# Patient Record
Sex: MALE | Race: WHITE | Employment: FULL TIME | URBAN - METROPOLITAN AREA
[De-identification: names, ages, dates, MRNs, and addresses within clinical notes are randomized per-mention and may not be internally consistent; named-entity substitution may affect disease eponyms.]

---

## 2018-01-26 PROCEDURE — 82607 VITAMIN B-12: CPT | Performed by: FAMILY MEDICINE

## 2018-01-26 PROCEDURE — 82746 ASSAY OF FOLIC ACID SERUM: CPT | Performed by: FAMILY MEDICINE

## 2018-02-13 PROCEDURE — 88305 TISSUE EXAM BY PATHOLOGIST: CPT | Performed by: INTERNAL MEDICINE

## 2018-03-08 ENCOUNTER — LAB ENCOUNTER (OUTPATIENT)
Dept: LAB | Age: 63
End: 2018-03-08
Attending: DERMATOLOGY
Payer: COMMERCIAL

## 2018-03-08 DIAGNOSIS — B99.9 INFECTION: ICD-10-CM

## 2018-03-08 PROCEDURE — 87070 CULTURE OTHR SPECIMN AEROBIC: CPT

## 2018-03-08 PROCEDURE — 87147 CULTURE TYPE IMMUNOLOGIC: CPT

## 2018-03-08 PROCEDURE — 87205 SMEAR GRAM STAIN: CPT

## 2018-03-12 NOTE — PROGRESS NOTES
Spoke with patient informing of path results and treatment. Patient verbalized understanding and was advised to contact office with any questions or concerns.

## 2018-03-12 NOTE — PROGRESS NOTES
Wound culture did not grow any \"bad\" bacteria, only bacteria normally found on the skin. He should still finish the doxycycline and apply the mupirocin to both this site and the previous excision site.

## 2021-01-26 PROBLEM — N18.31 STAGE 3A CHRONIC KIDNEY DISEASE (HCC): Status: ACTIVE | Noted: 2021-01-26

## 2022-04-05 PROBLEM — R97.20 ELEVATED PSA: Status: ACTIVE | Noted: 2022-04-05

## 2023-06-05 ENCOUNTER — HOSPITAL ENCOUNTER (OUTPATIENT)
Dept: GENERAL RADIOLOGY | Facility: HOSPITAL | Age: 68
Discharge: HOME | End: 2023-06-05
Attending: UROLOGY
Payer: MEDICARE

## 2023-06-05 ENCOUNTER — LABORATORY ENCOUNTER (OUTPATIENT)
Dept: LAB | Facility: HOSPITAL | Age: 68
End: 2023-06-05
Attending: UROLOGY
Payer: MEDICARE

## 2023-06-05 DIAGNOSIS — N18.31 STAGE 3A CHRONIC KIDNEY DISEASE (HCC): ICD-10-CM

## 2023-06-05 DIAGNOSIS — Z01.818 PRE-OP TESTING: ICD-10-CM

## 2023-06-05 LAB
ANTIBODY SCREEN: NEGATIVE
RH BLOOD TYPE: POSITIVE

## 2023-06-05 PROCEDURE — 86850 RBC ANTIBODY SCREEN: CPT

## 2023-06-05 PROCEDURE — 86901 BLOOD TYPING SEROLOGIC RH(D): CPT

## 2023-06-05 PROCEDURE — 71046 X-RAY EXAM CHEST 2 VIEWS: CPT | Performed by: UROLOGY

## 2023-06-05 PROCEDURE — 86900 BLOOD TYPING SEROLOGIC ABO: CPT

## 2023-06-13 ENCOUNTER — TELEPHONE (OUTPATIENT)
Dept: RADIATION ONCOLOGY | Facility: HOSPITAL | Age: 68
End: 2023-06-13

## 2023-06-13 ENCOUNTER — TELEPHONE (OUTPATIENT)
Dept: HEMATOLOGY/ONCOLOGY | Facility: HOSPITAL | Age: 68
End: 2023-06-13

## 2023-06-13 NOTE — TELEPHONE ENCOUNTER
NEW CONSULT  Delfino Da Silvaes  : 53  Ph:[phone]  Ref: Srinivasa Hale  To [address]  Dx: Prostate Cancer  Insurance: Medicare part [place] [id number]

## 2023-06-14 ENCOUNTER — TELEPHONE (OUTPATIENT)
Dept: RADIATION ONCOLOGY | Facility: HOSPITAL | Age: 68
End: 2023-06-14

## 2023-06-14 NOTE — TELEPHONE ENCOUNTER
Patient calling to schedule consult with Dr Jacquie Kiran. There are 2 encounters from [place],  1 of them pertaining to medical records.   A Good portion of them,  Can be seen in [facility]

## 2023-06-16 ENCOUNTER — TELEPHONE (OUTPATIENT)
Dept: HEMATOLOGY/ONCOLOGY | Facility: HOSPITAL | Age: 68
End: 2023-06-16

## 2023-06-16 NOTE — TELEPHONE ENCOUNTER
Received an overnight message that the patient would like to schedule an appointment with Dr Amalia Gray. No other information provided.

## 2023-06-26 ENCOUNTER — TELEPHONE (OUTPATIENT)
Dept: RADIATION ONCOLOGY | Facility: HOSPITAL | Age: 68
End: 2023-06-26

## 2023-06-26 NOTE — TELEPHONE ENCOUNTER
Patient is calling to see if his records are there and he want to make an appointment with Dr. Jada Cr.  6/26/23

## 2023-06-26 NOTE — TELEPHONE ENCOUNTER
Patient is calling to see if his records are there and he want to make an appointment with Dr. Soco Mahan.  6/26/23

## 2023-07-18 ENCOUNTER — HOSPITAL ENCOUNTER (OUTPATIENT)
Dept: RADIATION ONCOLOGY | Facility: HOSPITAL | Age: 68
Discharge: HOME | End: 2023-07-18
Attending: RADIOLOGY
Payer: MEDICARE

## 2023-07-18 VITALS
SYSTOLIC BLOOD PRESSURE: 110 MMHG | DIASTOLIC BLOOD PRESSURE: 81 MMHG | HEART RATE: 51 BPM | TEMPERATURE: 96 F | OXYGEN SATURATION: 96 % | RESPIRATION RATE: 18 BRPM | WEIGHT: 233.63 LBS | BODY MASS INDEX: 30 KG/M2

## 2023-07-18 DIAGNOSIS — C61 PROSTATE CANCER (HCC): Primary | ICD-10-CM

## 2023-07-18 PROCEDURE — 99214 OFFICE O/P EST MOD 30 MIN: CPT

## 2023-07-18 NOTE — PATIENT INSTRUCTIONS
- PLEASE CALL [phone] WITH YOUR TREATMENT DECISION, OR IF YOU HAVE QUESTIONS REGARDING RADIATION THERAPY. - IF YOU DECIDE TO HAVE RADIATION TREATMENTS, WE WILL CALL TO SCHEDULE YOU FOR YOUR CT SIMULATION FOR RADIATION PLANNING.

## 2023-07-19 ENCOUNTER — RESEARCH ENCOUNTER (OUTPATIENT)
Dept: HEMATOLOGY/ONCOLOGY | Facility: HOSPITAL | Age: 68
End: 2023-07-19

## 2023-07-19 NOTE — PROGRESS NOTES
Research note  Potential Predict candidate  Phone call    Research nurse attempted to contact patient to discuss interest in Predict clinical trial. Unable to leave a VM. RN will try and contact him again tomorrow.

## 2023-07-20 ENCOUNTER — RESEARCH ENCOUNTER (OUTPATIENT)
Dept: HEMATOLOGY/ONCOLOGY | Facility: HOSPITAL | Age: 68
End: 2023-07-20

## 2023-07-20 NOTE — PROGRESS NOTES
Research Note. Study: NRG-/PREDICT-RT  Potential study patient  Visit: Phone call     Patient was presented with the trial by Dr. Yu Porras at his radiation consultation 7/18/23. Research RN contacted patient by phone to review study in more detail and jarod interest in participation. RN reviewed the purpose, study procedures/treatment, risks/side effects of study therapy, study groups, and the randomization process with the patient. Explained that upon signing the consent he would enter the screening phase of study and we would need to obtain tissue from his prostate biopsy at [facility] in order to run the decipher score. Explained to patient his rights as a research participant, and that it is his choice whether he wants to participate or not. RN did inform the patient that his PSMA PET would be expiring, per study timeframe, and we would need to repeat it in order for him to be eligible for study. Patient thanked research for contacting him, but he is planning to stay with Dr. Karis Mcfarland mostly due to the proximity to his house. Research thanked him for taking the time to speak with me and provided my contact information should he have any further questions or concerns.

## 2023-09-18 ENCOUNTER — ORDER TRANSCRIPTION (OUTPATIENT)
Dept: PHYSICAL THERAPY | Facility: HOSPITAL | Age: 68
End: 2023-09-18

## 2023-09-18 DIAGNOSIS — R49.8 HYPOPHONIA: Primary | ICD-10-CM

## 2023-10-09 ENCOUNTER — OFFICE VISIT (OUTPATIENT)
Dept: SPEECH THERAPY | Facility: HOSPITAL | Age: 68
End: 2023-10-09
Attending: Other
Payer: MEDICARE

## 2023-10-09 DIAGNOSIS — R49.8 HYPOPHONIA: Primary | ICD-10-CM

## 2023-10-09 PROCEDURE — 92523 SPEECH SOUND LANG COMPREHEN: CPT

## 2023-10-25 ENCOUNTER — OFFICE VISIT (OUTPATIENT)
Dept: SPEECH THERAPY | Facility: HOSPITAL | Age: 68
End: 2023-10-25
Attending: Other

## 2023-10-25 PROCEDURE — 92507 TX SP LANG VOICE COMM INDIV: CPT

## 2023-11-01 ENCOUNTER — OFFICE VISIT (OUTPATIENT)
Dept: SPEECH THERAPY | Facility: HOSPITAL | Age: 68
End: 2023-11-01
Attending: Other
Payer: MEDICARE

## 2023-11-01 PROCEDURE — 92507 TX SP LANG VOICE COMM INDIV: CPT

## 2023-11-08 ENCOUNTER — OFFICE VISIT (OUTPATIENT)
Dept: SPEECH THERAPY | Facility: HOSPITAL | Age: 68
End: 2023-11-08
Attending: Other
Payer: MEDICARE

## 2023-11-08 PROCEDURE — 92507 TX SP LANG VOICE COMM INDIV: CPT

## 2023-11-15 ENCOUNTER — APPOINTMENT (OUTPATIENT)
Dept: SPEECH THERAPY | Facility: HOSPITAL | Age: 68
End: 2023-11-15
Attending: Other
Payer: MEDICARE

## 2023-11-20 ENCOUNTER — OFFICE VISIT (OUTPATIENT)
Dept: SPEECH THERAPY | Facility: HOSPITAL | Age: 68
End: 2023-11-20
Attending: Other
Payer: MEDICARE

## 2023-11-20 PROCEDURE — 92507 TX SP LANG VOICE COMM INDIV: CPT

## 2023-11-27 ENCOUNTER — APPOINTMENT (OUTPATIENT)
Dept: SPEECH THERAPY | Facility: HOSPITAL | Age: 68
End: 2023-11-27
Attending: Other
Payer: MEDICARE

## 2023-12-04 ENCOUNTER — APPOINTMENT (OUTPATIENT)
Dept: SPEECH THERAPY | Facility: HOSPITAL | Age: 68
End: 2023-12-04
Attending: Other
Payer: MEDICARE

## 2023-12-11 ENCOUNTER — OFFICE VISIT (OUTPATIENT)
Dept: SPEECH THERAPY | Facility: HOSPITAL | Age: 68
End: 2023-12-11
Attending: Other
Payer: MEDICARE

## 2023-12-11 PROCEDURE — 92507 TX SP LANG VOICE COMM INDIV: CPT

## 2023-12-18 ENCOUNTER — OFFICE VISIT (OUTPATIENT)
Dept: SPEECH THERAPY | Facility: HOSPITAL | Age: 68
End: 2023-12-18
Attending: Other
Payer: MEDICARE

## 2023-12-18 PROCEDURE — 92507 TX SP LANG VOICE COMM INDIV: CPT

## 2023-12-27 ENCOUNTER — APPOINTMENT (OUTPATIENT)
Dept: SPEECH THERAPY | Facility: HOSPITAL | Age: 68
End: 2023-12-27
Attending: Other
Payer: MEDICARE

## 2024-01-03 ENCOUNTER — OFFICE VISIT (OUTPATIENT)
Dept: SPEECH THERAPY | Facility: HOSPITAL | Age: 69
End: 2024-01-03
Attending: Other
Payer: MEDICARE

## 2024-01-03 PROCEDURE — 92507 TX SP LANG VOICE COMM INDIV: CPT

## 2024-01-03 NOTE — PROGRESS NOTES
Diagnosis:   Hypophonia (R49.8)       Referring Provider: Tamiko  Date of Evaluation:    10/9/23    Precautions:  None Next MD visit:   none scheduled  Date of Surgery: n/a   Insurance Primary/Secondary: MEDICARE / COMMERCIAL     # Auth Visits: NA            Subjective: No reported changes    Pain: 0/10      Objective:     Date: 10/25/2023  TX#: 2/10 Date: 11/1/2023  TX#: 3/10 Date: 11/8/2023  TX#: 4/10 Date: 11/20/2023  TX#: 5/10 Date: 12/11/2023  Tx#: 6/10 Date: 12/18/2023  Tx#: 7/10   EMST-150 at 30 cmH20 WHITE MOUTHPIECE   25/25 reps  30-second rest break EMST-150 at 30 cmH20 WHITE MOUTHPIECE   25/25 reps  30-second rest break EMST-150 at 30 cmH20 WHITE MOUTHPIECE   25/25 reps  25-second rest break EMST-150 at 30 cmH20 WHITE MOUTHPIECE   25/25 reps  25-second rest break EMST-150 at 37.5 cmH20 WHITE MOUTHPIECE   25/25 reps  30-second rest break EMST-150 at 37.5 cmH20 WHITE MOUTHPIECE   25/25 reps  30-second rest break   Sustained phonation via \"ah\": x15 reps average 80 dB for 6.5 seconds Sustained phonation via \"ah\": x10 reps average 74 dB for 7.0 seconds Sustained phonation via \"ah\": x10 reps average 83 dB for 4.7 seconds Sustained phonation via \"ah\": x10 reps average 83 dB for 5.0 seconds Sustained phonation via \"ah\": x10 reps average 88 dB for 3.9 seconds Sustained phonation via \"ah\": x10 reps average 86 dB for 4.7 seconds     Ascending/descending pitch glide: x10 reps average 80 dB given min verbal cues Ascending/descending pitch glide: x10 reps average 80 dB given min verbal cues       Functional phrases: average 77 dB given min verbal cues  GOAL MET        Date: 1/3/2024  Tx#: 8/10 Date:       TX#:  Date:       TX#:  Date:          TX#:  Date:   Tx#:  Date:   Tx#:   IOPI lingual strength  Anterior @11 kPa: 50/50  Posterior @12 kPa: 50/50            Assessment: This facility received an IOPI device therefore, labial and lingual strength were measured. Pt's labial and lingual strength are both significantly  below normal limits and goals have been added to enhance labial/lingual strength via IOPI. Pt able to complete anterior and posterior lingual presses x50 each. Pt benefited from visual cues (i.e., green light on device) for feedback of performance. Recommend labial and lingual strengthening be addressed in conjunction with expiratory muscle strength training.       Goals:     LTG    To improve breath support for improved vocal loudness in order to communicate wants, needs, and ideas to familiar and unfamiliar communication partners across settings    STGs  To perform vocal warm-up exercises, x5 reps given min verbal cues    To perform sustained phonation exercise “ah” x10 reps given min verbal cues    To perform ascending/descending pitch glide, x10 reps given min verbal cues    To perform counting exercise x3 reps given min verbal cues    To read a list of words/phrases given min verbal cues 11/8/2023: Goal met   To read a list of sentences given min verbal cues    To perform cognitive-communicative exercises given min verbal cues    To perform x5 sets of x5 reps via EMST-150 at 37.5 cmH20 with breaks per EMST-150 protocol    To complete 2 sets of 25 anterior AND posterior lingual presses (for a total of 50) using [employer] Oral Performance Instrument  at 80% of maximum pressure as evidenced by achieving the green light indicator in 100% of trials 1/3/2024: MIPA: 14 kPa, MIPP: 15 kPa   To complete 2 sets of 25 right AND left labial presses (for a total of 50) using [employer] Oral Performance Instrument  at 80% of maximum pressure as evidenced by achieving the green light indicator in 100% of trials 1/3/2024: MIPL: 7 kPa, MIPR: 6 kPa     Plan: Recommend continue POC as established    HEP: EMST-150, sustained phonation    Charges: 29216       Total Treatment Time: 45 min

## 2024-01-09 ENCOUNTER — APPOINTMENT (OUTPATIENT)
Dept: SPEECH THERAPY | Facility: HOSPITAL | Age: 69
End: 2024-01-09
Attending: Other
Payer: MEDICARE

## 2024-02-13 ENCOUNTER — OFFICE VISIT (OUTPATIENT)
Dept: SPEECH THERAPY | Facility: HOSPITAL | Age: 69
End: 2024-02-13
Attending: Other
Payer: MEDICARE

## 2024-02-13 PROCEDURE — 92507 TX SP LANG VOICE COMM INDIV: CPT

## 2024-02-13 NOTE — PROGRESS NOTES
Diagnosis:   Hypophonia (R49.8)       Referring Provider: Tamiko  Date of Evaluation:    10/9/23    Precautions:  None Next MD visit:   none scheduled  Date of Surgery: n/a   Insurance Primary/Secondary: MEDICARE / COMMERCIAL     # Auth Visits: NA            Subjective: Pt saw a neurologist and had a brain scan which came back as normal.     Pain: 0/10      Objective:     Date: 10/25/2023  TX#: 2/10 Date: 11/1/2023  TX#: 3/10 Date: 11/8/2023  TX#: 4/10 Date: 11/20/2023  TX#: 5/10 Date: 12/11/2023  Tx#: 6/10 Date: 12/18/2023  Tx#: 7/10   EMST-150 at 30 cmH20 WHITE MOUTHPIECE   25/25 reps  30-second rest break EMST-150 at 30 cmH20 WHITE MOUTHPIECE   25/25 reps  30-second rest break EMST-150 at 30 cmH20 WHITE MOUTHPIECE   25/25 reps  25-second rest break EMST-150 at 30 cmH20 WHITE MOUTHPIECE   25/25 reps  25-second rest break EMST-150 at 37.5 cmH20 WHITE MOUTHPIECE   25/25 reps  30-second rest break EMST-150 at 37.5 cmH20 WHITE MOUTHPIECE   25/25 reps  30-second rest break   Sustained phonation via \"ah\": x15 reps average 80 dB for 6.5 seconds Sustained phonation via \"ah\": x10 reps average 74 dB for 7.0 seconds Sustained phonation via \"ah\": x10 reps average 83 dB for 4.7 seconds Sustained phonation via \"ah\": x10 reps average 83 dB for 5.0 seconds Sustained phonation via \"ah\": x10 reps average 88 dB for 3.9 seconds Sustained phonation via \"ah\": x10 reps average 86 dB for 4.7 seconds     Ascending/descending pitch glide: x10 reps average 80 dB given min verbal cues Ascending/descending pitch glide: x10 reps average 80 dB given min verbal cues       Functional phrases: average 77 dB given min verbal cues  GOAL MET        Date: 1/3/2024  Tx#: 8/10 Date: 2/13/2024  Tx#: 9/10 Date:       TX#:  Date:          TX#:  Date:   Tx#:  Date:   Tx#:    EMST-150 at 52.5 cmH20 WHITE MOUTHPIECE   25/25 reps  30-second rest break       IOPI lingual strength  MIPA: 14 kPa  MIPP: 15 kPa  Anterior @11 kPa: 50/50  Posterior @12 kPa: 50/50             Assessment: EMST-150 resistance increased per protocol. Progress noted as Pt able to achieve audible airflow at increased resistance across all trials suggesting improvements in expiratory muscle strength and endurance. Pt continued to benefit from rest breaks per EMST-150 protocol. In addition, Pt benefited from use of nose clip and manually pressing his cheeks in to facilitate airflow.       Goals:     LTG    To improve breath support for improved vocal loudness in order to communicate wants, needs, and ideas to familiar and unfamiliar communication partners across settings    STGs  To perform vocal warm-up exercises, x5 reps given min verbal cues    To perform sustained phonation exercise “ah” x10 reps given min verbal cues    To perform ascending/descending pitch glide, x10 reps given min verbal cues    To perform counting exercise x3 reps given min verbal cues    To read a list of words/phrases given min verbal cues 11/8/2023: Goal met   To read a list of sentences given min verbal cues    To perform cognitive-communicative exercises given min verbal cues    To perform x5 sets of x5 reps via EMST-150 at 52.5 cmH20 with breaks per EMST-150 protocol    To complete 2 sets of 25 anterior AND posterior lingual presses (for a total of 50) using [employer] Instrument  at 80% of maximum pressure as evidenced by achieving the green light indicator in 100% of trials 1/3/2024: MIPA: 14 kPa, MIPP: 15 kPa   To complete 2 sets of 25 right AND left labial presses (for a total of 50) using [employer] Instrument  at 80% of maximum pressure as evidenced by achieving the green light indicator in 100% of trials 1/3/2024: MIPL: 7 kPa, MIPR: 6 kPa     Plan: Recommend continue POC as established    HEP: EMST-150, sustained phonation    Charges: [id number]       Total Treatment Time: 45 min

## 2024-02-23 ENCOUNTER — APPOINTMENT (OUTPATIENT)
Dept: SPEECH THERAPY | Facility: HOSPITAL | Age: 69
End: 2024-02-23
Attending: Other
Payer: MEDICARE

## 2024-02-23 PROCEDURE — 92507 TX SP LANG VOICE COMM INDIV: CPT

## 2024-02-23 NOTE — PROGRESS NOTES
Diagnosis:   Hypophonia (R49.8)       Referring Provider: Tamiko  Date of Evaluation:    10/9/23    Precautions:  None Next MD visit:   none scheduled  Date of Surgery: n/a   Insurance Primary/Secondary: MEDICARE / COMMERCIAL     # Auth Visits: NA            Subjective: No reported changes since last session    Pain: 0/10      Objective:     Date: 10/25/2023  TX#: 2/10 Date: 11/1/2023  TX#: 3/10 Date: 11/8/2023  TX#: 4/10 Date: 11/20/2023  TX#: 5/10 Date: 12/11/2023  Tx#: 6/10 Date: 12/18/2023  Tx#: 7/10   EMST-150 at 30 cmH20 WHITE MOUTHPIECE   25/25 reps  30-second rest break EMST-150 at 30 cmH20 WHITE MOUTHPIECE   25/25 reps  30-second rest break EMST-150 at 30 cmH20 WHITE MOUTHPIECE   25/25 reps  25-second rest break EMST-150 at 30 cmH20 WHITE MOUTHPIECE   25/25 reps  25-second rest break EMST-150 at 37.5 cmH20 WHITE MOUTHPIECE   25/25 reps  30-second rest break EMST-150 at 37.5 cmH20 WHITE MOUTHPIECE   25/25 reps  30-second rest break   Sustained phonation via \"ah\": x15 reps average 80 dB for 6.5 seconds Sustained phonation via \"ah\": x10 reps average 74 dB for 7.0 seconds Sustained phonation via \"ah\": x10 reps average 83 dB for 4.7 seconds Sustained phonation via \"ah\": x10 reps average 83 dB for 5.0 seconds Sustained phonation via \"ah\": x10 reps average 88 dB for 3.9 seconds Sustained phonation via \"ah\": x10 reps average 86 dB for 4.7 seconds     Ascending/descending pitch glide: x10 reps average 80 dB given min verbal cues Ascending/descending pitch glide: x10 reps average 80 dB given min verbal cues       Functional phrases: average 77 dB given min verbal cues  GOAL MET        Date: 1/3/2024  Tx#: 8/10 Date: 2/13/2024  Tx#: 9/10 Date:       TX#:  Date:          TX#:  Date:   Tx#:  Date:   Tx#:    EMST-150 at 52.5 cmH20 WHITE MOUTHPIECE   25/25 reps  30-second rest break       IOPI lingual strength  MIPA: 14 kPa  MIPP: 15 kPa  Anterior @11 kPa: 50/50  Posterior @12 kPa: 50/50         Sustained phonation via \"ah\":  x10 reps average 80 dB for 6.1 seconds           Assessment: EMST-150 resistance increased per protocol. Progress noted as Pt able to achieve audible airflow at increased resistance across all trials suggesting improvements in expiratory muscle strength and endurance. Pt continued to benefit from rest breaks per EMST-150 protocol. In addition, Pt benefited from use of nose clip and manually pressing his cheeks in to facilitate airflow. Progress noted as Pt with overall increased length compared to previous sessions with a clear vocal quality.       Goals:     LTG    To improve breath support for improved vocal loudness in order to communicate wants, needs, and ideas to familiar and unfamiliar communication partners across settings    STGs  To perform vocal warm-up exercises, x5 reps given min verbal cues    To perform sustained phonation exercise “ah” x10 reps given min verbal cues    To perform ascending/descending pitch glide, x10 reps given min verbal cues    To perform counting exercise x3 reps given min verbal cues    To read a list of words/phrases given min verbal cues 11/8/2023: Goal met   To read a list of sentences given min verbal cues    To perform cognitive-communicative exercises given min verbal cues    To perform x5 sets of x5 reps via EMST-150 at 52.5 cmH20 with breaks per EMST-150 protocol    To complete 2 sets of 25 anterior AND posterior lingual presses (for a total of 50) using [employer] Performance Instrument  at 80% of maximum pressure as evidenced by achieving the green light indicator in 100% of trials 1/3/2024: MIPA: 14 kPa, MIPP: 15 kPa   To complete 2 sets of 25 right AND left labial presses (for a total of 50) using [employer] Instrument  at 80% of maximum pressure as evidenced by achieving the green light indicator in 100% of trials 1/3/2024: MIPL: 7 kPa, MIPR: 6 kPa     Plan: Recommend continue POC as established    HEP: EMST-150, sustained phonation    Charges: [id number]        Total Treatment Time: 45 min

## 2024-02-27 ENCOUNTER — APPOINTMENT (OUTPATIENT)
Dept: SPEECH THERAPY | Facility: HOSPITAL | Age: 69
End: 2024-02-27
Attending: Other
Payer: MEDICARE

## 2024-03-06 ENCOUNTER — OFFICE VISIT (OUTPATIENT)
Dept: SPEECH THERAPY | Facility: HOSPITAL | Age: 69
End: 2024-03-06
Attending: Other
Payer: MEDICARE

## 2024-03-06 PROCEDURE — 92507 TX SP LANG VOICE COMM INDIV: CPT

## 2024-03-06 NOTE — PROGRESS NOTES
Diagnosis:   Hypophonia (R49.8)       Referring Provider: Tamiko  Date of Evaluation:    10/9/23    Precautions:  None Next MD visit:   none scheduled  Date of Surgery: n/a   Insurance Primary/Secondary: MEDICARE / COMMERCIAL     # Auth Visits: NA             Progress Summary  Pt has attended 10 visits in Speech Therapy.     Assessment: Progress noted during this course of treatment. Treatment has focused on enhancing breath support as well as lingua/labial strength for improved speech intelligibility. Progress noted as the resistance on the EMST-150 has increased suggesting improvements in expiratory muscle strength and endurance. Pt continues to benefit from use of nose clip d/t nasal emission as well as manually pressing his cheeks in d/t labial leakage. The [place] Oral Performance Instrument was introduced to enhance Pt's labial/lingual strength. Pt's scores are below normal limits. This exercise has only been perfomed x1 since its introduction. Outcome measures support progress noted in treatment as all of Pt's SMR scores improved suggesting improvements in coordination and precision of articulators. In addition, Pt's s/z ratio is now WNL suggesting improvements in breath support for vocal fold vibration. Despite progress, Pt continues to demonstrate a mild-moderate undifferentiated dysarthria characterized by imprecise consonant production, a rough/breathy vocal quality, reduced breath support for speech, reduced loudness, and reduced stress. Continued skilled ST services are strongly recommended.    Outcome Measures:    SMRs  Puh: 5.6 --> 6.2 (WNL=6.4), slow/imprecise  Tuh: 4.8 -->  5.8 (WNL = 6.1), slow/imprecise  Kuh: 3.8 --5.0 (WNL = 5.7), slow/imprecise    AMR  Puh-tuh-kuh: 24/15 seconds --> 24/15 seconds (WNL = 25/15 seconds), slow/imprecise      S/Z Ratio: 1.4 --> .83 (s/z ratio of 1.4 or great suggests laryngeal pathology)      Goals:     LTG    To improve breath support for improved vocal loudness in order  to communicate wants, needs, and ideas to familiar and unfamiliar communication partners across settings - 3/6/2024: Progressing, continue    STGs  To perform vocal warm-up exercises, x5 reps given min verbal cues 3/6/2024: Progressing, continue   To perform sustained phonation exercise “ah” x10 reps given min verbal cues 3/6/2024: Progressing, continue   To perform ascending/descending pitch glide, x10 reps given min verbal cues 3/6/2024: Progressing, continue   To perform counting exercise x3 reps given min verbal cues 3/6/2024: Progressing, continue   To read a list of words/phrases given min verbal cues 11/8/2023: Goal met   To read a list of sentences given min verbal cues 3/6/2024: Progressing, continue   To perform cognitive-communicative exercises given min verbal cues 3/6/2024: Progressing, continue   To perform x5 sets of x5 reps via EMST-150 at 52.5 cmH20 with breaks per EMST-150 protocol 3/6/2024: Progressing, continue   To complete 2 sets of 25 anterior AND posterior lingual presses (for a total of 50) using [employer] Instrument  at 80% of maximum pressure as evidenced by achieving the green light indicator in 100% of trials 1/3/2024: MIPA: 14 kPa, MIPP: 15 kPa  3/6/2024: Progressing, continue   To complete 2 sets of 25 right AND left labial presses (for a total of 50) using [employer] Instrument  at 80% of maximum pressure as evidenced by achieving the green light indicator in 100% of trials 1/3/2024: MIPL: 7 kPa, MIPR: 6 kPa  3/6/2024: Progressing, continue       Plan: Continue skilled Speech Therapy 1x/week or a total of 10 visits over a 90 day period. Treatment will include: speech therapy       Patient/Family/Caregiver was advised of these findings, precautions, and treatment options and has agreed to actively participate in planning and for this course of care.    Thank you for your referral. If you have any questions, please contact me at Dept:  [phone].    Sincerely,  Electronically signed by therapist: Amy Mendoza SLP     Physician's certification required:  Yes  Please co-sign or sign and return this letter via fax as soon as possible to [phone].   I certify the need for these services furnished under this plan of treatment and while under my care.    X___________________________________________________ Date____________________    Certification From: 3/6/2024  To:6/4/2024     Subjective: No reported changes since last session    Pain: 0/10      Objective:     Date: 10/25/2023  TX#: 2/10 Date: 11/1/2023  TX#: 3/10 Date: 11/8/2023  TX#: 4/10 Date: 11/20/2023  TX#: 5/10 Date: 12/11/2023  Tx#: 6/10 Date: 12/18/2023  Tx#: 7/10   EMST-150 at 30 cmH20 WHITE MOUTHPIECE   25/25 reps  30-second rest break EMST-150 at 30 cmH20 WHITE MOUTHPIECE   25/25 reps  30-second rest break EMST-150 at 30 cmH20 WHITE MOUTHPIECE   25/25 reps  25-second rest break EMST-150 at 30 cmH20 WHITE MOUTHPIECE   25/25 reps  25-second rest break EMST-150 at 37.5 cmH20 WHITE MOUTHPIECE   25/25 reps  30-second rest break EMST-150 at 37.5 cmH20 WHITE MOUTHPIECE   25/25 reps  30-second rest break   Sustained phonation via \"ah\": x15 reps average 80 dB for 6.5 seconds Sustained phonation via \"ah\": x10 reps average 74 dB for 7.0 seconds Sustained phonation via \"ah\": x10 reps average 83 dB for 4.7 seconds Sustained phonation via \"ah\": x10 reps average 83 dB for 5.0 seconds Sustained phonation via \"ah\": x10 reps average 88 dB for 3.9 seconds Sustained phonation via \"ah\": x10 reps average 86 dB for 4.7 seconds     Ascending/descending pitch glide: x10 reps average 80 dB given min verbal cues Ascending/descending pitch glide: x10 reps average 80 dB given min verbal cues       Functional phrases: average 77 dB given min verbal cues  GOAL MET        Date: 1/3/2024  Tx#: 8/10 Date: 2/13/2024  Tx#: 9/10 Date: 3/6/2024       TX#: 10/10 Date:          TX#:  Date:   Tx#:  Date:   Tx#:     EMST-150 at 52.5 cmH20 WHITE MOUTHPIECE   25/25 reps  30-second rest break EMST-150 at 52.5 cmH20 WHITE MOUTHPIECE   5/5 reps  30-second rest break      IOPI lingual strength  MIPA: 14 kPa  MIPP: 15 kPa  Anterior @11 kPa: 50/50  Posterior @12 kPa: 50/50         Sustained phonation via \"ah\": x10 reps average 80 dB for 6.1 seconds         Spirometer HEP        Plan: Recommend continue POC as established    HEP: EMST-150, sustained phonation    Charges: [id number]       Total Treatment Time: 45 min

## 2024-03-11 ENCOUNTER — TELEPHONE (OUTPATIENT)
Dept: PHYSICAL THERAPY | Facility: HOSPITAL | Age: 69
End: 2024-03-11

## 2024-03-15 ENCOUNTER — OFFICE VISIT (OUTPATIENT)
Dept: SPEECH THERAPY | Facility: HOSPITAL | Age: 69
End: 2024-03-15
Attending: Other
Payer: MEDICARE

## 2024-03-15 PROCEDURE — 92507 TX SP LANG VOICE COMM INDIV: CPT

## 2024-03-15 NOTE — PROGRESS NOTES
Diagnosis:   Hypophonia (R49.8)       Referring Provider: Tamiko  Date of Evaluation:    10/9/23    Precautions:  None Next MD visit:   none scheduled  Date of Surgery: n/a   Insurance Primary/Secondary: MEDICARE / COMMERCIAL     # Auth Visits: NA            Subjective: No reported changes since last session    Pain: 0/10      Objective:     Date: 10/25/2023  TX#: 2/10 Date: 11/1/2023  TX#: 3/10 Date: 11/8/2023  TX#: 4/10 Date: 11/20/2023  TX#: 5/10 Date: 12/11/2023  Tx#: 6/10 Date: 12/18/2023  Tx#: 7/10   EMST-150 at 30 cmH20 WHITE MOUTHPIECE   25/25 reps  30-second rest break EMST-150 at 30 cmH20 WHITE MOUTHPIECE   25/25 reps  30-second rest break EMST-150 at 30 cmH20 WHITE MOUTHPIECE   25/25 reps  25-second rest break EMST-150 at 30 cmH20 WHITE MOUTHPIECE   25/25 reps  25-second rest break EMST-150 at 37.5 cmH20 WHITE MOUTHPIECE   25/25 reps  30-second rest break EMST-150 at 37.5 cmH20 WHITE MOUTHPIECE   25/25 reps  30-second rest break   Sustained phonation via \"ah\": x15 reps average 80 dB for 6.5 seconds Sustained phonation via \"ah\": x10 reps average 74 dB for 7.0 seconds Sustained phonation via \"ah\": x10 reps average 83 dB for 4.7 seconds Sustained phonation via \"ah\": x10 reps average 83 dB for 5.0 seconds Sustained phonation via \"ah\": x10 reps average 88 dB for 3.9 seconds Sustained phonation via \"ah\": x10 reps average 86 dB for 4.7 seconds     Ascending/descending pitch glide: x10 reps average 80 dB given min verbal cues Ascending/descending pitch glide: x10 reps average 80 dB given min verbal cues       Functional phrases: average 77 dB given min verbal cues  GOAL MET        Date: 1/3/2024  Tx#: 8/10 Date: 2/13/2024  Tx#: 9/10 Date: 3/6/2024       TX#: 10/10 Date: 3/15/2024         TX#: 1/10 Date:   Tx#:  Date:   Tx#:    EMST-150 at 52.5 cmH20 WHITE MOUTHPIECE   25/25 reps  30-second rest break EMST-150 at 52.5 cmH20 WHITE MOUTHPIECE   5/5 reps  30-second rest break EMST-150 at 52.5 + 1/8 cmH20 WHITE  MOUTHPIECE   5/5 reps  30-second rest break     IOPI lingual strength  MIPA: 14 kPa  MIPP: 15 kPa  Anterior @11 kPa: 50/50  Posterior @12 kPa: 50/50   IOPI lingual strength  MIPA: 19 kPa  MIPP: 19 kPa  Anterior @15 kPa: 50/50,   Posterior @15 kPa: 50/50      Sustained phonation via \"ah\": x10 reps average 80 dB for 6.1 seconds         Spirometer HEP        Assessment: MIP taken as it has not be taken since the beginning of January. Progress noted as MIP for both lingual placements increased. In addition, Pt able to complete x50 reps in both the anterior and posterior lingual placements at a higher resistance with no rest breaks required. Pt unable to achieve audible airflow when EMST-150 resistance was increased by 1/4 turn; however, he was able to achieve audible airflow when resistance was increased by 1/8 turn. Pt continued to benefit from use of nose clip d/t nasal emission as well as manually pressing his cheeks in d/t labial leakage.       Goals:     LTG    To improve breath support for improved vocal loudness in order to communicate wants, needs, and ideas to familiar and unfamiliar communication partners across settings - 3/6/2024: Progressing, continue    STGs  To perform vocal warm-up exercises, x5 reps given min verbal cues 3/6/2024: Progressing, continue   To perform sustained phonation exercise “ah” x10 reps given min verbal cues 3/6/2024: Progressing, continue   To perform ascending/descending pitch glide, x10 reps given min verbal cues 3/6/2024: Progressing, continue   To perform counting exercise x3 reps given min verbal cues 3/6/2024: Progressing, continue   To read a list of words/phrases given min verbal cues 11/8/2023: Goal met   To read a list of sentences given min verbal cues 3/6/2024: Progressing, continue   To perform cognitive-communicative exercises given min verbal cues 3/6/2024: Progressing, continue   To perform x5 sets of x5 reps via EMST-150 at 52.5 cmH20 with breaks per EMST-150  protocol 3/6/2024: Progressing, continue   To complete 2 sets of 25 anterior AND posterior lingual presses (for a total of 50) using [employer] Instrument  at 80% of maximum pressure as evidenced by achieving the green light indicator in 100% of trials 1/3/2024: MIPA: 14 kPa, MIPP: 15 kPa  3/6/2024: Progressing, continue   To complete 2 sets of 25 right AND left labial presses (for a total of 50) using [employer] Instrument  at 80% of maximum pressure as evidenced by achieving the green light indicator in 100% of trials 1/3/2024: MIPL: 7 kPa, MIPR: 6 kPa  3/6/2024: Progressing, continue     Plan: Recommend continue POC as established    HEP: EMST-150, sustained phonation    Charges: [id number]       Total Treatment Time: 45 min

## 2024-03-18 ENCOUNTER — OFFICE VISIT (OUTPATIENT)
Dept: SPEECH THERAPY | Facility: HOSPITAL | Age: 69
End: 2024-03-18
Attending: Other
Payer: MEDICARE

## 2024-03-18 PROCEDURE — 92507 TX SP LANG VOICE COMM INDIV: CPT

## 2024-03-18 NOTE — PROGRESS NOTES
Diagnosis:   Hypophonia (R49.8)       Referring Provider: Tamiko  Date of Evaluation:    10/9/23    Precautions:  None Next MD visit:   none scheduled  Date of Surgery: n/a   Insurance Primary/Secondary: MEDICARE / COMMERCIAL     # Auth Visits: NA            Subjective: No reported changes since last session    Pain: 0/10      Objective:     Date: 10/25/2023  TX#: 2/10 Date: 11/1/2023  TX#: 3/10 Date: 11/8/2023  TX#: 4/10 Date: 11/20/2023  TX#: 5/10 Date: 12/11/2023  Tx#: 6/10 Date: 12/18/2023  Tx#: 7/10   EMST-150 at 30 cmH20 WHITE MOUTHPIECE   25/25 reps  30-second rest break EMST-150 at 30 cmH20 WHITE MOUTHPIECE   25/25 reps  30-second rest break EMST-150 at 30 cmH20 WHITE MOUTHPIECE   25/25 reps  25-second rest break EMST-150 at 30 cmH20 WHITE MOUTHPIECE   25/25 reps  25-second rest break EMST-150 at 37.5 cmH20 WHITE MOUTHPIECE   25/25 reps  30-second rest break EMST-150 at 37.5 cmH20 WHITE MOUTHPIECE   25/25 reps  30-second rest break   Sustained phonation via \"ah\": x15 reps average 80 dB for 6.5 seconds Sustained phonation via \"ah\": x10 reps average 74 dB for 7.0 seconds Sustained phonation via \"ah\": x10 reps average 83 dB for 4.7 seconds Sustained phonation via \"ah\": x10 reps average 83 dB for 5.0 seconds Sustained phonation via \"ah\": x10 reps average 88 dB for 3.9 seconds Sustained phonation via \"ah\": x10 reps average 86 dB for 4.7 seconds     Ascending/descending pitch glide: x10 reps average 80 dB given min verbal cues Ascending/descending pitch glide: x10 reps average 80 dB given min verbal cues       Functional phrases: average 77 dB given min verbal cues  GOAL MET        Date: 1/3/2024  Tx#: 8/10 Date: 2/13/2024  Tx#: 9/10 Date: 3/6/2024       TX#: 10/10 Date: 3/15/2024         TX#: 1/10 Date: 3/18/2024  Tx#: 2/10 Date:   Tx#:    EMST-150 at 52.5 cmH20 WHITE MOUTHPIECE   25/25 reps  30-second rest break EMST-150 at 52.5 cmH20 WHITE MOUTHPIECE   5/5 reps  30-second rest break EMST-150 at 52.5 + 1/8 cmH20  WHITE MOUTHPIECE   5/5 reps  30-second rest break     IOPI lingual strength  MIPA: 14 kPa  MIPP: 15 kPa  Anterior @11 kPa: 50/50  Posterior @12 kPa: 50/50   IOPI lingual strength  MIPA: 19 kPa  MIPP: 19 kPa  Anterior @15 kPa: 50/50,   Posterior @15 kPa: 50/50      Sustained phonation via \"ah\": x10 reps average 80 dB for 6.1 seconds   Sustained phonation via \"ah\": x15 reps average 81 dB for 5.7 seconds        Ascending/descending pitch glide: x15 reps average 83 dB given min verbal cues        Oral reading sentences: 74 dB given min verbal cues      Spirometer HEP        Assessment: EMST-150 and IOPI not addressed d/t Pt forgetting his EMST-150 device and the tongue bulb at home. Pt with decrased lenght during sustained phonation; however, his amplitude increased. Progress noted as Pt demonstrated increased vocal amplitude during ascending/descending pitch glides. Pt benefited from min verbal cues when orally reading sentence-level material with adequate vocal amplitude noted; recommend this goal be addressed x1 more session for consistency.         Goals:     LTG    To improve breath support for improved vocal loudness in order to communicate wants, needs, and ideas to familiar and unfamiliar communication partners across settings - 3/6/2024: Progressing, continue    STGs  To perform vocal warm-up exercises, x5 reps given min verbal cues 3/6/2024: Progressing, continue   To perform sustained phonation exercise “ah” x10 reps given min verbal cues 3/6/2024: Progressing, continue   To perform ascending/descending pitch glide, x10 reps given min verbal cues 3/6/2024: Progressing, continue   To perform counting exercise x3 reps given min verbal cues 3/6/2024: Progressing, continue   To read a list of words/phrases given min verbal cues 11/8/2023: Goal met   To read a list of sentences given min verbal cues 3/6/2024: Progressing, continue   To perform cognitive-communicative exercises given min verbal cues 3/6/2024:  Progressing, continue   To perform x5 sets of x5 reps via EMST-150 at 52.5 cmH20 with breaks per EMST-150 protocol 3/6/2024: Progressing, continue   To complete 2 sets of 25 anterior AND posterior lingual presses (for a total of 50) using [employer] Instrument  at 80% of maximum pressure as evidenced by achieving the green light indicator in 100% of trials 1/3/2024: MIPA: 14 kPa, MIPP: 15 kPa  3/6/2024: Progressing, continue   To complete 2 sets of 25 right AND left labial presses (for a total of 50) using [employer] Instrument  at 80% of maximum pressure as evidenced by achieving the green light indicator in 100% of trials 1/3/2024: MIPL: 7 kPa, MIPR: 6 kPa  3/6/2024: Progressing, continue     Plan: Recommend continue POC as established    HEP: EMST-150, sustained phonation    Charges: [id number]       Total Treatment Time: 45 min     no

## 2024-03-26 ENCOUNTER — OFFICE VISIT (OUTPATIENT)
Dept: SPEECH THERAPY | Facility: HOSPITAL | Age: 69
End: 2024-03-26
Attending: Other
Payer: MEDICARE

## 2024-03-26 PROCEDURE — 92507 TX SP LANG VOICE COMM INDIV: CPT

## 2024-03-26 NOTE — PROGRESS NOTES
Diagnosis:   Hypophonia (R49.8)       Referring Provider: Tamiko  Date of Evaluation:    10/9/23    Precautions:  None Next MD visit:   none scheduled  Date of Surgery: n/a   Insurance Primary/Secondary: MEDICARE / COMMERCIAL     # Auth Visits: NA            Subjective: No reported changes since last session    Pain: 0/10      Objective:     Date: 10/25/2023  TX#: 2/10 Date: 11/1/2023  TX#: 3/10 Date: 11/8/2023  TX#: 4/10 Date: 11/20/2023  TX#: 5/10 Date: 12/11/2023  Tx#: 6/10 Date: 12/18/2023  Tx#: 7/10   EMST-150 at 30 cmH20 WHITE MOUTHPIECE   25/25 reps  30-second rest break EMST-150 at 30 cmH20 WHITE MOUTHPIECE   25/25 reps  30-second rest break EMST-150 at 30 cmH20 WHITE MOUTHPIECE   25/25 reps  25-second rest break EMST-150 at 30 cmH20 WHITE MOUTHPIECE   25/25 reps  25-second rest break EMST-150 at 37.5 cmH20 WHITE MOUTHPIECE   25/25 reps  30-second rest break EMST-150 at 37.5 cmH20 WHITE MOUTHPIECE   25/25 reps  30-second rest break   Sustained phonation via \"ah\": x15 reps average 80 dB for 6.5 seconds Sustained phonation via \"ah\": x10 reps average 74 dB for 7.0 seconds Sustained phonation via \"ah\": x10 reps average 83 dB for 4.7 seconds Sustained phonation via \"ah\": x10 reps average 83 dB for 5.0 seconds Sustained phonation via \"ah\": x10 reps average 88 dB for 3.9 seconds Sustained phonation via \"ah\": x10 reps average 86 dB for 4.7 seconds     Ascending/descending pitch glide: x10 reps average 80 dB given min verbal cues Ascending/descending pitch glide: x10 reps average 80 dB given min verbal cues       Functional phrases: average 77 dB given min verbal cues  GOAL MET        Date: 1/3/2024  Tx#: 8/10 Date: 2/13/2024  Tx#: 9/10 Date: 3/6/2024       TX#: 10/10 Date: 3/15/2024         TX#: 1/10 Date: 3/18/2024  Tx#: 2/10 Date: 3/26/2024  Tx#: 3/10    EMST-150 at 52.5 cmH20 WHITE MOUTHPIECE   25/25 reps  30-second rest break EMST-150 at 52.5 cmH20 WHITE MOUTHPIECE   5/5 reps  30-second rest break EMST-150 at 52.5  + 1/8 cmH20 WHITE MOUTHPIECE   5/5 reps  30-second rest break  EMST-150 at 60 cmH20 WHITE MOUTHPIECE   4/5 reps  30-second rest break   IOPI lingual strength  MIPA: 14 kPa  MIPP: 15 kPa  Anterior @11 kPa: 50/50  Posterior @12 kPa: 50/50   IOPI lingual strength  MIPA: 19 kPa  MIPP: 19 kPa  Anterior @15 kPa: 50/50,   Posterior @15 kPa: 50/50  IOPI lingual strength  Anterior @15 kPa: 50/50,   Posterior @15 kPa: 50/50        IOPI labial strength  MIPL: 8 kPa  MIPR: 8 kPa  Left @ 6 kPa: 50/50  Right @ 6 kPa: 50/50      Sustained phonation via \"ah\": x10 reps average 80 dB for 6.1 seconds   Sustained phonation via \"ah\": x15 reps average 81 dB for 5.7 seconds        Ascending/descending pitch glide: x15 reps average 83 dB given min verbal cues        Oral reading sentences: 74 dB given min verbal cues      Spirometer HEP        Assessment: Pt able to complete all 50 reps in anterior/posterior lingual placements without any rest breaks. MIP taken for both R and L labial placements. Pt with significantly reduced labial strength as his MIP was 8 kPa and average strength is 35 kPa. Pt able to complete all 50 reps for both placements at 80% MIP with no rest breaks required. EMST-150 resistance increased by 1/8 turn so device is at 60 cmH20. Pt able to achieve audible airflow in 4/5 trials given rest breaks per EMST-150 protocol. In addition, Pt benefited from use of nose clip d/t nasal emission as well as manually pressing his cheeks in d/t labial leakage.       Goals:     LTG    To improve breath support for improved vocal loudness in order to communicate wants, needs, and ideas to familiar and unfamiliar communication partners across settings - 3/6/2024: Progressing, continue    STGs  To perform vocal warm-up exercises, x5 reps given min verbal cues 3/6/2024: Progressing, continue   To perform sustained phonation exercise “ah” x10 reps given min verbal cues 3/6/2024: Progressing, continue   To perform ascending/descending  pitch glide, x10 reps given min verbal cues 3/6/2024: Progressing, continue   To perform counting exercise x3 reps given min verbal cues 3/6/2024: Progressing, continue   To read a list of words/phrases given min verbal cues 11/8/2023: Goal met   To read a list of sentences given min verbal cues 3/6/2024: Progressing, continue   To perform cognitive-communicative exercises given min verbal cues 3/6/2024: Progressing, continue   To perform x5 sets of x5 reps via EMST-150 at 60 cmH20 with breaks per EMST-150 protocol 3/6/2024: Progressing, continue   To complete 2 sets of 25 anterior AND posterior lingual presses (for a total of 50) using [employer] Instrument  at 80% of maximum pressure as evidenced by achieving the green light indicator in 100% of trials 1/3/2024: MIPA: 14 kPa, MIPP: 15 kPa  3/6/2024: Progressing, continue   To complete 2 sets of 25 right AND left labial presses (for a total of 50) using [employer] Instrument  at 80% of maximum pressure as evidenced by achieving the green light indicator in 100% of trials 1/3/2024: MIPL: 7 kPa, MIPR: 6 kPa  3/6/2024: Progressing, continue     Plan: Recommend continue POC as established    HEP: EMST-150, sustained phonation    Charges: [id number]       Total Treatment Time: 45 min

## 2024-04-05 ENCOUNTER — OFFICE VISIT (OUTPATIENT)
Dept: SPEECH THERAPY | Facility: HOSPITAL | Age: 69
End: 2024-04-05
Attending: Other
Payer: MEDICARE

## 2024-04-05 PROCEDURE — 92507 TX SP LANG VOICE COMM INDIV: CPT

## 2024-04-05 NOTE — PROGRESS NOTES
Diagnosis:   Hypophonia (R49.8)       Referring Provider: Tamiko  Date of Evaluation:    10/9/23    Precautions:  None Next MD visit:   none scheduled  Date of Surgery: n/a   Insurance Primary/Secondary: MEDICARE / COMMERCIAL     # Auth Visits: NA            Subjective: No reported changes since last session    Pain: 0/10      Objective:     Date: 10/25/2023  TX#: 2/10 Date: 11/1/2023  TX#: 3/10 Date: 11/8/2023  TX#: 4/10 Date: 11/20/2023  TX#: 5/10 Date: 12/11/2023  Tx#: 6/10 Date: 12/18/2023  Tx#: 7/10   EMST-150 at 30 cmH20 WHITE MOUTHPIECE   25/25 reps  30-second rest break EMST-150 at 30 cmH20 WHITE MOUTHPIECE   25/25 reps  30-second rest break EMST-150 at 30 cmH20 WHITE MOUTHPIECE   25/25 reps  25-second rest break EMST-150 at 30 cmH20 WHITE MOUTHPIECE   25/25 reps  25-second rest break EMST-150 at 37.5 cmH20 WHITE MOUTHPIECE   25/25 reps  30-second rest break EMST-150 at 37.5 cmH20 WHITE MOUTHPIECE   25/25 reps  30-second rest break   Sustained phonation via \"ah\": x15 reps average 80 dB for 6.5 seconds Sustained phonation via \"ah\": x10 reps average 74 dB for 7.0 seconds Sustained phonation via \"ah\": x10 reps average 83 dB for 4.7 seconds Sustained phonation via \"ah\": x10 reps average 83 dB for 5.0 seconds Sustained phonation via \"ah\": x10 reps average 88 dB for 3.9 seconds Sustained phonation via \"ah\": x10 reps average 86 dB for 4.7 seconds     Ascending/descending pitch glide: x10 reps average 80 dB given min verbal cues Ascending/descending pitch glide: x10 reps average 80 dB given min verbal cues       Functional phrases: average 77 dB given min verbal cues  GOAL MET        Date: 1/3/2024  Tx#: 8/10 Date: 2/13/2024  Tx#: 9/10 Date: 3/6/2024       TX#: 10/10 Date: 3/15/2024         TX#: 1/10 Date: 3/18/2024  Tx#: 2/10 Date: 3/26/2024  Tx#: 3/10    EMST-150 at 52.5 cmH20 WHITE MOUTHPIECE   25/25 reps  30-second rest break EMST-150 at 52.5 cmH20 WHITE MOUTHPIECE   5/5 reps  30-second rest break EMST-150 at 52.5  + 1/8 cmH20 WHITE MOUTHPIECE   5/5 reps  30-second rest break  EMST-150 at 60 cmH20 WHITE MOUTHPIECE   4/5 reps  30-second rest break   IOPI lingual strength  MIPA: 14 kPa  MIPP: 15 kPa  Anterior @11 kPa: 50/50  Posterior @12 kPa: 50/50   IOPI lingual strength  MIPA: 19 kPa  MIPP: 19 kPa  Anterior @15 kPa: 50/50,   Posterior @15 kPa: 50/50  IOPI lingual strength  Anterior @15 kPa: 50/50,   Posterior @15 kPa: 50/50        IOPI labial strength  MIPL: 8 kPa  MIPR: 8 kPa  Left @ 6 kPa: 50/50  Right @ 6 kPa: 50/50      Sustained phonation via \"ah\": x10 reps average 80 dB for 6.1 seconds   Sustained phonation via \"ah\": x15 reps average 81 dB for 5.7 seconds        Ascending/descending pitch glide: x15 reps average 83 dB given min verbal cues        Oral reading sentences: 74 dB given min verbal cues      Spirometer HEP        Date: 4/5/2024  Tx#: 4/10 Date:       TX#:  Date:       TX#:  Date:          TX#:  Date:   TX#:  Date:   TX#:   EMST-150 at 63.75 cmH20 WHITE MOUTHPIECE   10/10 reps  30-second rest break        IOPI lingual strength  Anterior @15 kPa: 50/50,   Posterior @15 kPa: 50/50        IOPI labial strength  Left @ 6 kPa: 50/50  Right @ 6 kPa: 50/50          Assessment: Pt able to complete all 50 reps in anterior/posterior lingual placements without any rest breaks. Pt able to complete all 50 reps for both placements at 80% MIP with no rest breaks required during left and right labial presses. EMST-150 resistance increased per protocol. Progress noted as Pt able to achieve audible airflow at increased resistance across all trials suggesting improvements in expiratory muscle strength and endurance. Pt continued to benefit from rest breaks per EMST-150 protocol. In addition, Pt benefited from use of nose clip d/t nasal emission as well as manually pressing his cheeks in d/t labial leakage.       Goals:     LTG    To improve breath support for improved vocal loudness in order to communicate wants, needs, and ideas  to familiar and unfamiliar communication partners across settings - 3/6/2024: Progressing, continue    STGs  To perform vocal warm-up exercises, x5 reps given min verbal cues 3/6/2024: Progressing, continue   To perform sustained phonation exercise “ah” x10 reps given min verbal cues 3/6/2024: Progressing, continue   To perform ascending/descending pitch glide, x10 reps given min verbal cues 3/6/2024: Progressing, continue   To perform counting exercise x3 reps given min verbal cues 3/6/2024: Progressing, continue   To read a list of words/phrases given min verbal cues 11/8/2023: Goal met   To read a list of sentences given min verbal cues 3/6/2024: Progressing, continue   To perform cognitive-communicative exercises given min verbal cues 3/6/2024: Progressing, continue   To perform x5 sets of x5 reps via EMST-150 at 63.75 cmH20 with breaks per EMST-150 protocol 3/6/2024: Progressing, continue   To complete 2 sets of 25 anterior AND posterior lingual presses (for a total of 50) using [employer] Instrument  at 80% of maximum pressure as evidenced by achieving the green light indicator in 100% of trials 1/3/2024: MIPA: 14 kPa, MIPP: 15 kPa  3/6/2024: Progressing, continue   To complete 2 sets of 25 right AND left labial presses (for a total of 50) using [employer] Instrument  at 80% of maximum pressure as evidenced by achieving the green light indicator in 100% of trials 1/3/2024: MIPL: 7 kPa, MIPR: 6 kPa  3/6/2024: Progressing, continue     Plan: Recommend continue POC as established    HEP: EMST-150, sustained phonation    Charges: [id number]       Total Treatment Time: 45 min

## 2024-04-08 ENCOUNTER — OFFICE VISIT (OUTPATIENT)
Dept: SPEECH THERAPY | Facility: HOSPITAL | Age: 69
End: 2024-04-08
Attending: Other
Payer: MEDICARE

## 2024-04-08 PROCEDURE — 92507 TX SP LANG VOICE COMM INDIV: CPT

## 2024-04-08 NOTE — PROGRESS NOTES
Diagnosis:   Hypophonia (R49.8)       Referring Provider: Tamiko  Date of Evaluation:    10/9/23    Precautions:  None Next MD visit:   none scheduled  Date of Surgery: n/a   Insurance Primary/Secondary: MEDICARE / COMMERCIAL     # Auth Visits: NA            Subjective: No reported changes since last session    Pain: 0/10      Objective:     Date: 10/25/2023  TX#: 2/10 Date: 11/1/2023  TX#: 3/10 Date: 11/8/2023  TX#: 4/10 Date: 11/20/2023  TX#: 5/10 Date: 12/11/2023  Tx#: 6/10 Date: 12/18/2023  Tx#: 7/10   EMST-150 at 30 cmH20 WHITE MOUTHPIECE   25/25 reps  30-second rest break EMST-150 at 30 cmH20 WHITE MOUTHPIECE   25/25 reps  30-second rest break EMST-150 at 30 cmH20 WHITE MOUTHPIECE   25/25 reps  25-second rest break EMST-150 at 30 cmH20 WHITE MOUTHPIECE   25/25 reps  25-second rest break EMST-150 at 37.5 cmH20 WHITE MOUTHPIECE   25/25 reps  30-second rest break EMST-150 at 37.5 cmH20 WHITE MOUTHPIECE   25/25 reps  30-second rest break   Sustained phonation via \"ah\": x15 reps average 80 dB for 6.5 seconds Sustained phonation via \"ah\": x10 reps average 74 dB for 7.0 seconds Sustained phonation via \"ah\": x10 reps average 83 dB for 4.7 seconds Sustained phonation via \"ah\": x10 reps average 83 dB for 5.0 seconds Sustained phonation via \"ah\": x10 reps average 88 dB for 3.9 seconds Sustained phonation via \"ah\": x10 reps average 86 dB for 4.7 seconds     Ascending/descending pitch glide: x10 reps average 80 dB given min verbal cues Ascending/descending pitch glide: x10 reps average 80 dB given min verbal cues       Functional phrases: average 77 dB given min verbal cues  GOAL MET        Date: 1/3/2024  Tx#: 8/10 Date: 2/13/2024  Tx#: 9/10 Date: 3/6/2024       TX#: 10/10 Date: 3/15/2024         TX#: 1/10 Date: 3/18/2024  Tx#: 2/10 Date: 3/26/2024  Tx#: 3/10    EMST-150 at 52.5 cmH20 WHITE MOUTHPIECE   25/25 reps  30-second rest break EMST-150 at 52.5 cmH20 WHITE MOUTHPIECE   5/5 reps  30-second rest break EMST-150 at 52.5  + 1/8 cmH20 WHITE MOUTHPIECE   5/5 reps  30-second rest break  EMST-150 at 60 cmH20 WHITE MOUTHPIECE   4/5 reps  30-second rest break   IOPI lingual strength  MIPA: 14 kPa  MIPP: 15 kPa  Anterior @11 kPa: 50/50  Posterior @12 kPa: 50/50   IOPI lingual strength  MIPA: 19 kPa  MIPP: 19 kPa  Anterior @15 kPa: 50/50,   Posterior @15 kPa: 50/50  IOPI lingual strength  Anterior @15 kPa: 50/50,   Posterior @15 kPa: 50/50        IOPI labial strength  MIPL: 8 kPa  MIPR: 8 kPa  Left @ 6 kPa: 50/50  Right @ 6 kPa: 50/50      Sustained phonation via \"ah\": x10 reps average 80 dB for 6.1 seconds   Sustained phonation via \"ah\": x15 reps average 81 dB for 5.7 seconds        Ascending/descending pitch glide: x15 reps average 83 dB given min verbal cues        Oral reading sentences: 74 dB given min verbal cues      Spirometer HEP        Date: 4/5/2024  Tx#: 4/10 Date: 4/8/2024  TX#: 5/10 Date:       TX#:  Date:          TX#:  Date:   TX#:  Date:   TX#:   EMST-150 at 63.75 cmH20 WHITE MOUTHPIECE   10/10 reps  30-second rest break EMST-150 at 63.75 cmH20 WHITE MOUTHPIECE   5/5 reps  30-second rest break       IOPI lingual strength  Anterior @15 kPa: 50/50,   Posterior @15 kPa: 50/50 IOPI lingual strength  MIPA: 19 kPa  MIPP: 19 kPa  Anterior @17 kPa: 50/50,   Posterior @17 kPa: 50/50       IOPI labial strength  Left @ 6 kPa: 50/50  Right @ 6 kPa: 50/50 IOPI labial strength  MIPL: 9 kPa  MIPR: 8 kPa  Left @ 7 kPa: 50/50  Right @ 7 kPa: 50/50         Assessment: ESMT-150 resistance not increased d/t Pt reported this resistance is still challenging. Pt able to MIP taken this session. Pt able to achieve audible airflow at this resistance across all trials suggesting improvements in expiratory muscle strength and endurance. Pt continued to benefit from rest breaks per EMST-150 protocol. In addition, Pt benefited from use of nose clip d/t nasal emission as well as manually pressing his cheeks in d/t labial leakage. Anterior/posterior  lingual and right labial MIP remained the same; MIPL increased. Because anterior/posterior lingual MIP did not increase, resistance increased to 90% MIP. Progress noted as Pt able to complete all 50 reps lingual at 90% MIP. Pt able to complete all 50 reps at increased resistance during left labial presses with no rest breaks required. While MIP did not increase for right MIP resistance was increased to 90% MIP with all 50 reps completed at this increased resistance. Improvements on these exercises suggests improvements labial strength/endurance.       Goals:     LTG    To improve breath support for improved vocal loudness in order to communicate wants, needs, and ideas to familiar and unfamiliar communication partners across settings - 3/6/2024: Progressing, continue    STGs  To perform vocal warm-up exercises, x5 reps given min verbal cues 3/6/2024: Progressing, continue   To perform sustained phonation exercise “ah” x10 reps given min verbal cues 3/6/2024: Progressing, continue   To perform ascending/descending pitch glide, x10 reps given min verbal cues 3/6/2024: Progressing, continue   To perform counting exercise x3 reps given min verbal cues 3/6/2024: Progressing, continue   To read a list of words/phrases given min verbal cues 11/8/2023: Goal met   To read a list of sentences given min verbal cues 3/6/2024: Progressing, continue   To perform cognitive-communicative exercises given min verbal cues 3/6/2024: Progressing, continue   To perform x5 sets of x5 reps via EMST-150 at 63.75 cmH20 with breaks per EMST-150 protocol 3/6/2024: Progressing, continue   To complete 2 sets of 25 anterior AND posterior lingual presses (for a total of 50) using [employer] Instrument  at 80% of maximum pressure as evidenced by achieving the green light indicator in 100% of trials 1/3/2024: MIPA: 14 kPa, MIPP: 15 kPa  3/6/2024: Progressing, continue   To complete 2 sets of 25 right AND left labial presses (for a total  of 50) using [employer] Instrument  at 80% of maximum pressure as evidenced by achieving the green light indicator in 100% of trials 1/3/2024: MIPL: 7 kPa, MIPR: 6 kPa  3/6/2024: Progressing, continue     Plan: Recommend continue POC as established    HEP: EMST-150, sustained phonation    Charges: [id number]       Total Treatment Time: 45 min

## 2024-04-19 ENCOUNTER — OFFICE VISIT (OUTPATIENT)
Dept: SPEECH THERAPY | Facility: HOSPITAL | Age: 69
End: 2024-04-19
Attending: Other
Payer: MEDICARE

## 2024-04-19 PROCEDURE — 92507 TX SP LANG VOICE COMM INDIV: CPT

## 2024-05-08 ENCOUNTER — APPOINTMENT (OUTPATIENT)
Dept: SPEECH THERAPY | Facility: HOSPITAL | Age: 69
End: 2024-05-08
Attending: Other
Payer: MEDICARE

## 2024-05-10 ENCOUNTER — APPOINTMENT (OUTPATIENT)
Dept: SPEECH THERAPY | Facility: HOSPITAL | Age: 69
End: 2024-05-10
Attending: Other
Payer: MEDICARE

## 2024-05-24 ENCOUNTER — OFFICE VISIT (OUTPATIENT)
Dept: SPEECH THERAPY | Facility: HOSPITAL | Age: 69
End: 2024-05-24
Attending: Other
Payer: MEDICARE

## 2024-05-24 PROCEDURE — 92507 TX SP LANG VOICE COMM INDIV: CPT

## 2024-05-24 NOTE — PROGRESS NOTES
Diagnosis:   Hypophonia (R49.8)       Referring Provider: Tamiko  Date of Evaluation:    10/9/23    Precautions:  None Next MD visit:   none scheduled  Date of Surgery: n/a   Insurance Primary/Secondary: MEDICARE / COMMERCIAL     # Auth Visits: NA            Subjective: Pt has not practiced EMST-150 d/t falling and bruising his ribs ~3 weeks ago.    Pain: 0/10      Objective:     Date: 10/25/2023  TX#: 2/10 Date: 11/1/2023  TX#: 3/10 Date: 11/8/2023  TX#: 4/10 Date: 11/20/2023  TX#: 5/10 Date: 12/11/2023  Tx#: 6/10 Date: 12/18/2023  Tx#: 7/10   EMST-150 at 30 cmH20 WHITE MOUTHPIECE   25/25 reps  30-second rest break EMST-150 at 30 cmH20 WHITE MOUTHPIECE   25/25 reps  30-second rest break EMST-150 at 30 cmH20 WHITE MOUTHPIECE   25/25 reps  25-second rest break EMST-150 at 30 cmH20 WHITE MOUTHPIECE   25/25 reps  25-second rest break EMST-150 at 37.5 cmH20 WHITE MOUTHPIECE   25/25 reps  30-second rest break EMST-150 at 37.5 cmH20 WHITE MOUTHPIECE   25/25 reps  30-second rest break   Sustained phonation via \"ah\": x15 reps average 80 dB for 6.5 seconds Sustained phonation via \"ah\": x10 reps average 74 dB for 7.0 seconds Sustained phonation via \"ah\": x10 reps average 83 dB for 4.7 seconds Sustained phonation via \"ah\": x10 reps average 83 dB for 5.0 seconds Sustained phonation via \"ah\": x10 reps average 88 dB for 3.9 seconds Sustained phonation via \"ah\": x10 reps average 86 dB for 4.7 seconds     Ascending/descending pitch glide: x10 reps average 80 dB given min verbal cues Ascending/descending pitch glide: x10 reps average 80 dB given min verbal cues       Functional phrases: average 77 dB given min verbal cues  GOAL MET        Date: 1/3/2024  Tx#: 8/10 Date: 2/13/2024  Tx#: 9/10 Date: 3/6/2024       TX#: 10/10 Date: 3/15/2024         TX#: 1/10 Date: 3/18/2024  Tx#: 2/10 Date: 3/26/2024  Tx#: 3/10    EMST-150 at 52.5 cmH20 WHITE MOUTHPIECE   25/25 reps  30-second rest break EMST-150 at 52.5 cmH20 WHITE MOUTHPIECE   5/5  reps  30-second rest break EMST-150 at 52.5 + 1/8 cmH20 WHITE MOUTHPIECE   5/5 reps  30-second rest break  EMST-150 at 60 cmH20 WHITE MOUTHPIECE   4/5 reps  30-second rest break   IOPI lingual strength  MIPA: 14 kPa  MIPP: 15 kPa  Anterior @11 kPa: 50/50  Posterior @12 kPa: 50/50   IOPI lingual strength  MIPA: 19 kPa  MIPP: 19 kPa  Anterior @15 kPa: 50/50,   Posterior @15 kPa: 50/50  IOPI lingual strength  Anterior @15 kPa: 50/50,   Posterior @15 kPa: 50/50        IOPI labial strength  MIPL: 8 kPa  MIPR: 8 kPa  Left @ 6 kPa: 50/50  Right @ 6 kPa: 50/50      Sustained phonation via \"ah\": x10 reps average 80 dB for 6.1 seconds   Sustained phonation via \"ah\": x15 reps average 81 dB for 5.7 seconds        Ascending/descending pitch glide: x15 reps average 83 dB given min verbal cues        Oral reading sentences: 74 dB given min verbal cues      Spirometer HEP        Date: 4/5/2024  Tx#: 4/10 Date: 4/8/2024  TX#: 5/10 Date: 4/19/2024  TX#: 6/10 Date: 5/24/2024  TX#: 7/10 Date:   TX#:  Date:   TX#:   EMST-150 at 63.75 cmH20 WHITE MOUTHPIECE   10/10 reps  30-second rest break EMST-150 at 63.75 cmH20 WHITE MOUTHPIECE   5/5 reps  30-second rest break EMST-150 at 67.5 cmH20 WHITE MOUTHPIECE   5/5 reps  30-second rest break EMST-150 at 67.5 cmH20 WHITE MOUTHPIECE   5/5 reps  30-second rest break     IOPI lingual strength  Anterior @15 kPa: 50/50,   Posterior @15 kPa: 50/50 IOPI lingual strength  MIPA: 19 kPa  MIPP: 19 kPa  Anterior @17 kPa: 50/50,   Posterior @17 kPa: 50/50 IOPI lingual strength  Anterior @17 kPa: 50/50,   Posterior @17 kPa: 50/50      IOPI labial strength  Left @ 6 kPa: 50/50  Right @ 6 kPa: 50/50 IOPI labial strength  MIPL: 9 kPa  MIPR: 8 kPa  Left @ 7 kPa: 50/50  Right @ 7 kPa: 50/50 IOPI labial strength  Left @ 7 kPa: 50/50  Right @ 7 kPa: 50/50         Implementation speech compensatory strategies: 100%, min verbal cues       Assessment: EMST-150 resistance increased per protocol; however, Pt unable to  achieve audible airflow. Resistance reduced to 67.5 with audible airflow achieved across all trials. Pt continued to benefit from rest breaks per EMST-150 protocol. In addition, Pt benefited from use of nose clip d/t nasal emission as well as manually pressing his cheeks in d/t labial leakage. Pt benefited from min verbal cues for speech intelligibility strategy implementation resulting in improved speech intelligibility.     Goals:     LTG    To improve breath support for improved vocal loudness in order to communicate wants, needs, and ideas to familiar and unfamiliar communication partners across settings - 3/6/2024: Progressing, continue    STGs  To perform vocal warm-up exercises, x5 reps given min verbal cues 3/6/2024: Progressing, continue   To perform sustained phonation exercise “ah” x10 reps given min verbal cues 3/6/2024: Progressing, continue   To perform ascending/descending pitch glide, x10 reps given min verbal cues 3/6/2024: Progressing, continue   To perform counting exercise x3 reps given min verbal cues 3/6/2024: Progressing, continue   To read a list of words/phrases given min verbal cues 11/8/2023: Goal met   To read a list of sentences given min verbal cues 3/6/2024: Progressing, continue   To perform cognitive-communicative exercises given min verbal cues 3/6/2024: Progressing, continue   To perform x5 sets of x5 reps via EMST-150 at 63.75 cmH20 with breaks per EMST-150 protocol 3/6/2024: Progressing, continue   To complete 2 sets of 25 anterior AND posterior lingual presses (for a total of 50) using [employer] Instrument  at 80% of maximum pressure as evidenced by achieving the green light indicator in 100% of trials 1/3/2024: MIPA: 14 kPa, MIPP: 15 kPa  3/6/2024: Progressing, continue   To complete 2 sets of 25 right AND left labial presses (for a total of 50) using [employer] Instrument  at 80% of maximum pressure as evidenced by achieving the green light indicator in  100% of trials 1/3/2024: MIPL: 7 kPa, MIPR: 6 kPa  3/6/2024: Progressing, continue     Plan: Recommend continue POC as established    HEP: EMST-150, sustained phonation    Charges: [id number]       Total Treatment Time: 45 min

## 2024-05-28 ENCOUNTER — APPOINTMENT (OUTPATIENT)
Dept: SPEECH THERAPY | Facility: HOSPITAL | Age: 69
End: 2024-05-28
Attending: Other
Payer: MEDICARE

## 2024-06-07 ENCOUNTER — APPOINTMENT (OUTPATIENT)
Dept: SPEECH THERAPY | Facility: HOSPITAL | Age: 69
End: 2024-06-07
Attending: Other
Payer: MEDICARE

## 2024-06-11 ENCOUNTER — OFFICE VISIT (OUTPATIENT)
Dept: SPEECH THERAPY | Facility: HOSPITAL | Age: 69
End: 2024-06-11
Attending: Other
Payer: MEDICARE

## 2024-06-11 PROCEDURE — 92507 TX SP LANG VOICE COMM INDIV: CPT

## 2024-06-11 NOTE — PROGRESS NOTES
Diagnosis:   Hypophonia (R49.8)       Referring Provider: Tamiko  Date of Evaluation:    10/9/23    Precautions:  None Next MD visit:   none scheduled  Date of Surgery: n/a   Insurance Primary/Secondary: MEDICARE / COMMERCIAL     # Auth Visits: NA            Subjective: Pt had bronchitis last week. Pt is wearing a new brace to help him stand up straight.     Pain: 0/10      Objective:     Date: 10/25/2023  TX#: 2/10 Date: 11/1/2023  TX#: 3/10 Date: 11/8/2023  TX#: 4/10 Date: 11/20/2023  TX#: 5/10 Date: 12/11/2023  Tx#: 6/10 Date: 12/18/2023  Tx#: 7/10   EMST-150 at 30 cmH20 WHITE MOUTHPIECE   25/25 reps  30-second rest break EMST-150 at 30 cmH20 WHITE MOUTHPIECE   25/25 reps  30-second rest break EMST-150 at 30 cmH20 WHITE MOUTHPIECE   25/25 reps  25-second rest break EMST-150 at 30 cmH20 WHITE MOUTHPIECE   25/25 reps  25-second rest break EMST-150 at 37.5 cmH20 WHITE MOUTHPIECE   25/25 reps  30-second rest break EMST-150 at 37.5 cmH20 WHITE MOUTHPIECE   25/25 reps  30-second rest break   Sustained phonation via \"ah\": x15 reps average 80 dB for 6.5 seconds Sustained phonation via \"ah\": x10 reps average 74 dB for 7.0 seconds Sustained phonation via \"ah\": x10 reps average 83 dB for 4.7 seconds Sustained phonation via \"ah\": x10 reps average 83 dB for 5.0 seconds Sustained phonation via \"ah\": x10 reps average 88 dB for 3.9 seconds Sustained phonation via \"ah\": x10 reps average 86 dB for 4.7 seconds     Ascending/descending pitch glide: x10 reps average 80 dB given min verbal cues Ascending/descending pitch glide: x10 reps average 80 dB given min verbal cues       Functional phrases: average 77 dB given min verbal cues  GOAL MET        Date: 1/3/2024  Tx#: 8/10 Date: 2/13/2024  Tx#: 9/10 Date: 3/6/2024       TX#: 10/10 Date: 3/15/2024         TX#: 1/10 Date: 3/18/2024  Tx#: 2/10 Date: 3/26/2024  Tx#: 3/10    EMST-150 at 52.5 cmH20 WHITE MOUTHPIECE   25/25 reps  30-second rest break EMST-150 at 52.5 cmH20 WHITE MOUTHPIECE    5/5 reps  30-second rest break EMST-150 at 52.5 + 1/8 cmH20 WHITE MOUTHPIECE   5/5 reps  30-second rest break  EMST-150 at 60 cmH20 WHITE MOUTHPIECE   4/5 reps  30-second rest break   IOPI lingual strength  MIPA: 14 kPa  MIPP: 15 kPa  Anterior @11 kPa: 50/50  Posterior @12 kPa: 50/50   IOPI lingual strength  MIPA: 19 kPa  MIPP: 19 kPa  Anterior @15 kPa: 50/50,   Posterior @15 kPa: 50/50  IOPI lingual strength  Anterior @15 kPa: 50/50,   Posterior @15 kPa: 50/50        IOPI labial strength  MIPL: 8 kPa  MIPR: 8 kPa  Left @ 6 kPa: 50/50  Right @ 6 kPa: 50/50      Sustained phonation via \"ah\": x10 reps average 80 dB for 6.1 seconds   Sustained phonation via \"ah\": x15 reps average 81 dB for 5.7 seconds        Ascending/descending pitch glide: x15 reps average 83 dB given min verbal cues        Oral reading sentences: 74 dB given min verbal cues      Spirometer HEP        Date: 4/5/2024  Tx#: 4/10 Date: 4/8/2024  TX#: 5/10 Date: 4/19/2024  TX#: 6/10 Date: 5/24/2024  TX#: 7/10 Date: 6/11/2024  TX#: 8/10 Date:   TX#:   EMST-150 at 63.75 cmH20 WHITE MOUTHPIECE   10/10 reps  30-second rest break EMST-150 at 63.75 cmH20 WHITE MOUTHPIECE   5/5 reps  30-second rest break EMST-150 at 67.5 cmH20 WHITE MOUTHPIECE   5/5 reps  30-second rest break EMST-150 at 67.5 cmH20 WHITE MOUTHPIECE   5/5 reps  30-second rest break     IOPI lingual strength  Anterior @15 kPa: 50/50,   Posterior @15 kPa: 50/50 IOPI lingual strength  MIPA: 19 kPa  MIPP: 19 kPa  Anterior @17 kPa: 50/50,   Posterior @17 kPa: 50/50 IOPI lingual strength  Anterior @17 kPa: 50/50,   Posterior @17 kPa: 50/50      IOPI labial strength  Left @ 6 kPa: 50/50  Right @ 6 kPa: 50/50 IOPI labial strength  MIPL: 9 kPa  MIPR: 8 kPa  Left @ 7 kPa: 50/50  Right @ 7 kPa: 50/50 IOPI labial strength  Left @ 7 kPa: 50/50  Right @ 7 kPa: 50/50          Sustained phonation via \"ah\": x15 reps average 79 dB for 10.9 seconds        Ascending/descending pitch glide: x10 reps average 85 dB  given initial model        Implementation speech compensatory strategies: 100%, min verbal cues Implementation speech compensatory strategies: 100%, min verbal cues      Assessment: Progress noted as Pt with significantly increased length during sustained phonation with slightly reduced vocal amplitude; this suggests improvements in breath support. Progress noted as Pt with increased vocal amplitude during ascending/descending pitch glide exercise with reduced cues required. Pt continued to benefit from min verbal cues for implementation of speech compensatory strategies when his speech was unintelligible; this resulted in intelligible speech.     Goals:     LTG    To improve breath support for improved vocal loudness in order to communicate wants, needs, and ideas to familiar and unfamiliar communication partners across settings - 3/6/2024: Progressing, continue    STGs  To perform vocal warm-up exercises, x5 reps given min verbal cues 3/6/2024: Progressing, continue   To perform sustained phonation exercise “ah” x10 reps given min verbal cues 3/6/2024: Progressing, continue   To perform ascending/descending pitch glide, x10 reps given min verbal cues 3/6/2024: Progressing, continue   To perform counting exercise x3 reps given min verbal cues 3/6/2024: Progressing, continue   To read a list of words/phrases given min verbal cues 11/8/2023: Goal met   To read a list of sentences given min verbal cues 3/6/2024: Progressing, continue   To perform cognitive-communicative exercises given min verbal cues 3/6/2024: Progressing, continue   To perform x5 sets of x5 reps via EMST-150 at 63.75 cmH20 with breaks per EMST-150 protocol 3/6/2024: Progressing, continue   To complete 2 sets of 25 anterior AND posterior lingual presses (for a total of 50) using [employer] Instrument  at 80% of maximum pressure as evidenced by achieving the green light indicator in 100% of trials 1/3/2024: MIPA: 14 kPa, MIPP: 15  kPa  3/6/2024: Progressing, continue   To complete 2 sets of 25 right AND left labial presses (for a total of 50) using [employer] Instrument  at 80% of maximum pressure as evidenced by achieving the green light indicator in 100% of trials 1/3/2024: MIPL: 7 kPa, MIPR: 6 kPa  3/6/2024: Progressing, continue     Plan: Recommend continue POC as established    HEP: EMST-150, sustained phonation    Charges: [id number]       Total Treatment Time: 45 min

## 2024-06-18 ENCOUNTER — APPOINTMENT (OUTPATIENT)
Dept: SPEECH THERAPY | Facility: HOSPITAL | Age: 69
End: 2024-06-18
Attending: Other
Payer: MEDICARE

## 2024-06-28 ENCOUNTER — APPOINTMENT (OUTPATIENT)
Dept: SPEECH THERAPY | Facility: HOSPITAL | Age: 69
End: 2024-06-28
Attending: Other
Payer: MEDICARE

## 2024-07-08 ENCOUNTER — APPOINTMENT (OUTPATIENT)
Dept: SPEECH THERAPY | Facility: HOSPITAL | Age: 69
End: 2024-07-08
Attending: Other
Payer: MEDICARE

## (undated) NOTE — LETTER
Patient Name: Porfirio Pham  YOB: 1955          MRN :  [id number]  Date:  3/6/2024  Referring Physician:  Justin Morrison    Progress Summary  Pt has attended 10 visits in Speech Therapy.     Assessment: Progress noted during this course of treatment. Treatment has focused on enhancing breath support as well as lingua/labial strength for improved speech intelligibility. Progress noted as the resistance on the EMST-150 has increased suggesting improvements in expiratory muscle strength and endurance. Pt continues to benefit from use of nose clip d/t nasal emission as well as manually pressing his cheeks in d/t labial leakage. The [place] [employer] Instrument was introduced to enhance Pt's labial/lingual strength. Pt's scores are below normal limits. This exercise has only been perfomed x1 since its introduction. Outcome measures support progress noted in treatment as all of Pt's SMR scores improved suggesting improvements in coordination and precision of articulators. In addition, Pt's s/z ratio is now WNL suggesting improvements in breath support for vocal fold vibration. Despite progress, Pt continues to demonstrate a mild-moderate undifferentiated dysarthria characterized by imprecise consonant production, a rough/breathy vocal quality, reduced breath support for speech, reduced loudness, and reduced stress. Continued skilled ST services are strongly recommended.    Outcome Measures:    SMRs  Puh: 5.6 --> 6.2 (WNL=6.4), slow/imprecise  Tuh: 4.8 -->  5.8 (WNL = 6.1), slow/imprecise  Kuh: 3.8 --5.0 (WNL = 5.7), slow/imprecise    AMR  Puh-tuh-kuh: 24/15 seconds --> 24/15 seconds (WNL = 25/15 seconds), slow/imprecise      S/Z Ratio: 1.4 --> .83 (s/z ratio of 1.4 or great suggests laryngeal pathology)      Goals:     LTG    To improve breath support for improved vocal loudness in order to communicate wants, needs, and ideas to familiar and unfamiliar communication partners across settings - 3/6/2024:  Progressing, continue    STGs  To perform vocal warm-up exercises, x5 reps given min verbal cues 3/6/2024: Progressing, continue   To perform sustained phonation exercise “ah” x10 reps given min verbal cues 3/6/2024: Progressing, continue   To perform ascending/descending pitch glide, x10 reps given min verbal cues 3/6/2024: Progressing, continue   To perform counting exercise x3 reps given min verbal cues 3/6/2024: Progressing, continue   To read a list of words/phrases given min verbal cues 11/8/2023: Goal met   To read a list of sentences given min verbal cues 3/6/2024: Progressing, continue   To perform cognitive-communicative exercises given min verbal cues 3/6/2024: Progressing, continue   To perform x5 sets of x5 reps via EMST-150 at 52.5 cmH20 with breaks per EMST-150 protocol 3/6/2024: Progressing, continue   To complete 2 sets of 25 anterior AND posterior lingual presses (for a total of 50) using [employer] Instrument  at 80% of maximum pressure as evidenced by achieving the green light indicator in 100% of trials 1/3/2024: MIPA: 14 kPa, MIPP: 15 kPa  3/6/2024: Progressing, continue   To complete 2 sets of 25 right AND left labial presses (for a total of 50) using [employer] Instrument  at 80% of maximum pressure as evidenced by achieving the green light indicator in 100% of trials 1/3/2024: MIPL: 7 kPa, MIPR: 6 kPa  3/6/2024: Progressing, continue       Plan: Continue skilled Speech Therapy 1x/week or a total of 10 visits over a 90 day period. Treatment will include: speech therapy       Patient/Family/Caregiver was advised of these findings, precautions, and treatment options and has agreed to actively participate in planning and for this course of care.    Thank you for your referral. If you have any questions, please contact me at Dept: [phone].    Sincerely,  Electronically signed by therapist: Amy Mendoza, SLP     Physician's certification required:  Yes  Please co-sign  or sign and return this letter via fax as soon as possible to [phone].   I certify the need for these services furnished under this plan of treatment and while under my care.    X___________________________________________________ Date____________________    Certification From: 3/6/2024  To:6/4/2024 21st Century Cures Act Notice to Patient: Medical documents like this are made available to patients in the interest of transparency. However, be advised this is a medical document and it is intended as [id number] communication between your medical providers. This medical document may contain abbreviations, assessments, medical data, and results or other terms that are unfamiliar. Medical documents are intended to carry relevant information, facts as evident, and the clinical opinion of the practitioner. As such, this medical document may be written in language that appears blunt or direct. You are encouraged to contact your medical provider and/or [facility] if you have any questions about this medical document.